# Patient Record
Sex: MALE | Race: WHITE | NOT HISPANIC OR LATINO | URBAN - METROPOLITAN AREA
[De-identification: names, ages, dates, MRNs, and addresses within clinical notes are randomized per-mention and may not be internally consistent; named-entity substitution may affect disease eponyms.]

---

## 2019-02-15 ENCOUNTER — OFFICE VISIT (OUTPATIENT)
Dept: URBAN - METROPOLITAN AREA CLINIC 29 | Facility: CLINIC | Age: 84
End: 2019-02-15
Payer: MEDICARE

## 2019-02-15 PROCEDURE — 99214 OFFICE O/P EST MOD 30 MIN: CPT | Performed by: OPHTHALMOLOGY

## 2019-02-15 PROCEDURE — 92133 CPTRZD OPH DX IMG PST SGM ON: CPT | Performed by: OPHTHALMOLOGY

## 2019-02-15 ASSESSMENT — INTRAOCULAR PRESSURE
OS: 12
OD: 10

## 2019-02-15 NOTE — IMPRESSION/PLAN
Impression: Diagnosis: Primary open-angle glaucoma, bilateral, severe stage. Code: C67.5252. Trabeculectomy ou - revision of Trab OD 2/5/18
- IOP doing well ou - ONH stable ou - Plan: Discussed diagnosis, explained and understood by patient. Discussed IOP/ONH/Glaucoma management and risks. OCT ordered and reviewed with patient. continue lumigan OS qhs and dorzolamide bid OS and timolol OS bid. no gtts OD needed. will continue to monitor. patient will see OD in [place] in 4 months.

## 2019-12-18 ENCOUNTER — OFFICE VISIT (OUTPATIENT)
Dept: URBAN - METROPOLITAN AREA CLINIC 29 | Facility: CLINIC | Age: 84
End: 2019-12-18
Payer: MEDICARE

## 2019-12-18 PROCEDURE — 99213 OFFICE O/P EST LOW 20 MIN: CPT | Performed by: OPTOMETRIST

## 2019-12-18 RX ORDER — NETARSUDIL 0.2 MG/ML
0.02 % SOLUTION/ DROPS OPHTHALMIC; TOPICAL
Qty: 1 | Refills: 12 | Status: INACTIVE
Start: 2019-12-18 | End: 2019-12-18

## 2019-12-18 RX ORDER — NETARSUDIL AND LATANOPROST OPHTHALMIC SOLUTION, 0.02%/0.005% .2; .05 MG/ML; MG/ML
SOLUTION/ DROPS OPHTHALMIC; TOPICAL
Qty: 1 | Refills: 12 | Status: INACTIVE
Start: 2019-12-18 | End: 2020-02-10

## 2019-12-18 RX ORDER — BIMATOPROST 0.1 MG/ML
0.01 % SOLUTION/ DROPS OPHTHALMIC
Qty: 1 | Refills: 12 | Status: INACTIVE
Start: 2019-12-18 | End: 2019-12-18

## 2019-12-18 ASSESSMENT — INTRAOCULAR PRESSURE
OD: 14
OS: 14

## 2019-12-18 NOTE — IMPRESSION/PLAN
Impression: Primary open-angle glaucoma, bilateral, severe stage: H40.1133 OU. Condition: established, stable. Plan: Discussed diagnosis in detail with patient. Discussed treatment options with patient. New medication(s) Rx given today. Discussed posable side affect of new medications. Will continue to observe condition and or symptoms. Patient instructed to call if side affects occur or if symptoms worsen. Reassured patient of current condition and treatment.

## 2020-01-31 ENCOUNTER — OFFICE VISIT (OUTPATIENT)
Dept: URBAN - METROPOLITAN AREA CLINIC 29 | Facility: CLINIC | Age: 85
End: 2020-01-31
Payer: MEDICARE

## 2020-01-31 PROCEDURE — 99213 OFFICE O/P EST LOW 20 MIN: CPT | Performed by: OPTOMETRIST

## 2020-01-31 RX ORDER — BIMATOPROST 0.1 MG/ML
0.01 % SOLUTION/ DROPS OPHTHALMIC
Qty: 5 | Refills: 5 | Status: INACTIVE
Start: 2020-01-31 | End: 2020-02-11

## 2020-01-31 ASSESSMENT — INTRAOCULAR PRESSURE
OS: 12
OD: 13

## 2020-01-31 NOTE — IMPRESSION/PLAN
Impression: Primary open-angle glaucoma, bilateral, severe stage: H40.1133 OU. Symptoms: IOP is stable. Plan: Discussed diagnosis in detail with patient. Discussed treatment options with patient. No change to current treatment. Will continue to observe condition and or symptoms. Continue using current medication(s). Medication refill given today. Emphasized and explained compliance.

## 2020-04-28 ENCOUNTER — CONSULT (OUTPATIENT)
Dept: URBAN - METROPOLITAN AREA CLINIC 24 | Facility: CLINIC | Age: 85
End: 2020-04-28
Payer: MEDICARE

## 2020-04-28 DIAGNOSIS — H40.1133 PRIMARY OPEN-ANGLE GLAUCOMA, BILATERAL, SEVERE STAGE: Primary | ICD-10-CM

## 2020-04-28 PROCEDURE — 92133 CPTRZD OPH DX IMG PST SGM ON: CPT | Performed by: OPHTHALMOLOGY

## 2020-04-28 PROCEDURE — 92014 COMPRE OPH EXAM EST PT 1/>: CPT | Performed by: OPHTHALMOLOGY

## 2020-04-28 PROCEDURE — 92250 FUNDUS PHOTOGRAPHY W/I&R: CPT | Performed by: OPHTHALMOLOGY

## 2020-04-28 PROCEDURE — 76514 ECHO EXAM OF EYE THICKNESS: CPT | Performed by: OPHTHALMOLOGY

## 2020-04-28 PROCEDURE — 92083 EXTENDED VISUAL FIELD XM: CPT | Performed by: OPHTHALMOLOGY

## 2020-04-28 ASSESSMENT — INTRAOCULAR PRESSURE
OS: 23
OD: 20

## 2020-05-12 ENCOUNTER — SURGERY (OUTPATIENT)
Dept: URBAN - METROPOLITAN AREA SURGERY 12 | Facility: SURGERY | Age: 85
End: 2020-05-12
Payer: MEDICARE

## 2020-05-12 PROCEDURE — 65855 TRABECULOPLASTY LASER SURG: CPT | Performed by: OPHTHALMOLOGY

## 2020-06-18 ENCOUNTER — POST OP (OUTPATIENT)
Dept: URBAN - METROPOLITAN AREA CLINIC 26 | Facility: CLINIC | Age: 85
End: 2020-06-18
Payer: MEDICARE

## 2020-06-18 PROCEDURE — 99024 POSTOP FOLLOW-UP VISIT: CPT | Performed by: OPTOMETRIST

## 2020-06-18 ASSESSMENT — INTRAOCULAR PRESSURE
OD: 11
OS: 16
OD: 12
OS: 17

## 2020-06-23 ENCOUNTER — SURGERY (OUTPATIENT)
Dept: URBAN - METROPOLITAN AREA SURGERY 12 | Facility: SURGERY | Age: 85
End: 2020-06-23
Payer: MEDICARE

## 2020-06-23 PROCEDURE — 65855 TRABECULOPLASTY LASER SURG: CPT | Performed by: OPHTHALMOLOGY

## 2020-10-20 ENCOUNTER — FOLLOW UP ESTABLISHED (OUTPATIENT)
Dept: URBAN - METROPOLITAN AREA CLINIC 24 | Facility: CLINIC | Age: 85
End: 2020-10-20
Payer: MEDICARE

## 2020-10-20 PROCEDURE — 92012 INTRM OPH EXAM EST PATIENT: CPT | Performed by: OPHTHALMOLOGY

## 2020-10-20 ASSESSMENT — INTRAOCULAR PRESSURE
OS: 13
OD: 11

## 2021-04-27 ENCOUNTER — OFFICE VISIT (OUTPATIENT)
Dept: URBAN - METROPOLITAN AREA CLINIC 24 | Facility: CLINIC | Age: 86
End: 2021-04-27
Payer: MEDICARE

## 2021-04-27 PROCEDURE — 92250 FUNDUS PHOTOGRAPHY W/I&R: CPT | Performed by: OPHTHALMOLOGY

## 2021-04-27 PROCEDURE — 92083 EXTENDED VISUAL FIELD XM: CPT | Performed by: OPHTHALMOLOGY

## 2021-04-27 PROCEDURE — 99214 OFFICE O/P EST MOD 30 MIN: CPT | Performed by: OPHTHALMOLOGY

## 2021-04-27 ASSESSMENT — INTRAOCULAR PRESSURE
OS: 22
OD: 13

## 2021-04-27 NOTE — IMPRESSION/PLAN
Impression: Primary open-angle glaucoma, bilateral, severe stage Plan: PT HAS POAG OU                                                       PACHS: 540//543 PT IS UNDER CARE OF DR. Kevin Davis @ [address] , PT ;RESENTS FOR IOP CHECK THE PT HAD  REPORTED PROGRESSIVE SIGHT LOSS OVER THE PAST 3 MONTHS PRIOR TO PRESENTING ON 4/28/20 S/P SLT OD 5/12/20  S/P SLT OS 6/23/20 PT DENIES SULFA ALLERGY 
PT DENIES LUNG DZ 
TARGET IOP LOW TEENS OR LESS 
RECOMMEND : 
1. CONTINUE ROCKLATAN OU QPM (SWITCHED FROM RHOPRESSA BY DR Edmar Shaikh 03/09/20) 2. CONTINUE DORZOLAMIDE OS QAM (IOP WAS LOW OD, SWITCHED BY DR Edmar Shaikh 03/09/20) 3. OFFERED PT OPTION OF SLT OS TO ATTEMPT TO LOWER IOP 2-4MM FOR 6-12 MONTHS. THE PT WOULD LIKE TO PROCEED WITH SLT OS. THE PT IS AWARE OF THE LIMITATIONS OF SLT WHICH WILL NOT REPLACE HIS TOPICAL MEDICATIONS NOR IMPROVE HIS VISION. 4. SCHEDULE SLT OS 4/27/21

## 2021-05-11 ENCOUNTER — SURGERY (OUTPATIENT)
Dept: URBAN - METROPOLITAN AREA SURGERY 12 | Facility: SURGERY | Age: 86
End: 2021-05-11
Payer: MEDICARE

## 2021-05-11 PROCEDURE — 65855 TRABECULOPLASTY LASER SURG: CPT | Performed by: OPHTHALMOLOGY

## 2021-10-14 ENCOUNTER — OFFICE VISIT (OUTPATIENT)
Dept: URBAN - METROPOLITAN AREA CLINIC 24 | Facility: CLINIC | Age: 86
End: 2021-10-14
Payer: MEDICARE

## 2021-10-14 PROCEDURE — 99213 OFFICE O/P EST LOW 20 MIN: CPT | Performed by: OPHTHALMOLOGY

## 2021-10-14 PROCEDURE — 92133 CPTRZD OPH DX IMG PST SGM ON: CPT | Performed by: OPHTHALMOLOGY

## 2021-10-14 RX ORDER — DORZOLAMIDE HCL 20 MG/ML
2 % SOLUTION/ DROPS OPHTHALMIC
Qty: 10 | Refills: 0 | Status: ACTIVE
Start: 2021-10-14

## 2021-10-14 RX ORDER — NETARSUDIL AND LATANOPROST OPHTHALMIC SOLUTION, 0.02%/0.005% .2; .05 MG/ML; MG/ML
SOLUTION/ DROPS OPHTHALMIC; TOPICAL
Qty: 15 | Refills: 3 | Status: ACTIVE
Start: 2021-10-14

## 2021-10-14 ASSESSMENT — INTRAOCULAR PRESSURE
OS: 11
OD: 7

## 2021-10-14 NOTE — IMPRESSION/PLAN
Impression: Primary open-angle glaucoma, bilateral, severe stage s/p trab OU Plan: Pt has Glaucoma    Gonio :        Pachs: 540/543     Today's IOP :   7/11      Tmax  :  
Target IOP low teens to high single digits Pt denies Fhx of Glaucoma Left eye is the better seeing eye S/P SLT OD 5/12/20  S/P SLT OS 6/23/20 S/P OS 5/11/21 Last VF 4/28/20 Global loss C/D:  
OCT: 10/14/21 Pt denies Sulfa Allergy   // Pt denies Lung /Heart dx Plan :
1. Continue Rocklatan QHS OS
Dorzolamide BID OS 2. Doing well with IOP today; previous IOP during summer with 17 OS per pt 3. Gave [employer] grid for home checks daily given amount of vision loss - instructions on how to use 4.  Return in 2 months for HVF 10-2 OU and IOP check

## 2021-12-15 ENCOUNTER — OFFICE VISIT (OUTPATIENT)
Dept: URBAN - METROPOLITAN AREA CLINIC 24 | Facility: CLINIC | Age: 86
End: 2021-12-15
Payer: MEDICARE

## 2021-12-15 PROCEDURE — 92083 EXTENDED VISUAL FIELD XM: CPT | Performed by: OPHTHALMOLOGY

## 2021-12-15 PROCEDURE — 99213 OFFICE O/P EST LOW 20 MIN: CPT | Performed by: OPHTHALMOLOGY

## 2021-12-15 ASSESSMENT — INTRAOCULAR PRESSURE
OD: 8
OS: 11

## 2021-12-15 NOTE — IMPRESSION/PLAN
Impression: Primary open-angle glaucoma, bilateral, severe stage s/p trab OU Plan: Pt has Glaucoma    Gonio :        Pachs: 540/543     Today's IOP :   8, 11      Tmax  :  
Target IOP low teens to high single digits Pt denies Fhx of Glaucoma Left eye is the better seeing eye S/P SLT OD 5/12/20  S/P SLT OS 6/23/20 S/P OS 5/11/21 Last VF 4/28/20 Global loss HVf 10-2 (12/15/21) OD: Good-worsening of scotoma; OS: Good-worsening of scotoma C/D:  
OCT: 10/14/21 Pt denies Sulfa Allergy   // Pt denies Lung /Heart dx Plan :
1. Continue Rocklatan QHS OS
Dorzolamide BID OS 2. Doing well with IOP today; continue as directed.  
3. Return in 3 months for IOP check

## 2022-03-10 ENCOUNTER — OFFICE VISIT (OUTPATIENT)
Dept: URBAN - METROPOLITAN AREA CLINIC 24 | Facility: CLINIC | Age: 87
End: 2022-03-10
Payer: MEDICARE

## 2022-03-10 DIAGNOSIS — Z96.1 PRESENCE OF INTRAOCULAR LENS: ICD-10-CM

## 2022-03-10 PROCEDURE — 99213 OFFICE O/P EST LOW 20 MIN: CPT | Performed by: OPHTHALMOLOGY

## 2022-03-10 ASSESSMENT — INTRAOCULAR PRESSURE
OD: 9
OS: 19

## 2022-03-10 NOTE — IMPRESSION/PLAN
Impression: Primary open-angle glaucoma, bilateral, severe stage s/p trab OU Plan: Pt has Glaucoma    Gonio :  ss ou      Pachs: 540/543     Today's IOP :   9, 19     Tmax  :  50, 23 Target IOP low teens to high single digits Pt denies Fhx of Glaucoma Left eye is the better seeing eye S/P SLT OD 5/12/20  S/P SLT OS 6/23/20 S/P OS 5/11/21 Last VF 4/28/20 Global loss HVf 10-2 (12/15/21) OD: Good-worsening of scotoma; OS: Good-worsening of scotoma C/D:  .8-.85 / .9-.9 OCT: 10/14/21 Pt denies Sulfa Allergy   // Pt denies Lung /Heart dx Plan :
1. Continue Rocklatan QHS OS
Dorzolamide BID OS 2. Doing well. IOP is elevated. Patient states there is a possibility he is washing out the drops. Will recheck IOP to validate IOP spike.  
3. Return in 1-2 weeks for IOP check - If IOP is still elevated will proceed with SLT

## 2022-04-01 ENCOUNTER — OFFICE VISIT (OUTPATIENT)
Dept: URBAN - METROPOLITAN AREA CLINIC 24 | Facility: CLINIC | Age: 87
End: 2022-04-01
Payer: MEDICARE

## 2022-04-01 PROCEDURE — 99213 OFFICE O/P EST LOW 20 MIN: CPT | Performed by: STUDENT IN AN ORGANIZED HEALTH CARE EDUCATION/TRAINING PROGRAM

## 2022-04-01 ASSESSMENT — INTRAOCULAR PRESSURE
OD: 10
OS: 18

## 2022-04-01 NOTE — IMPRESSION/PLAN
Impression: Primary open-angle glaucoma, bilateral, severe stage s/p trab OU Plan: Cont elevation of IOP OS
IOP today 10/18mmHg; per pt good compliance with medications Continue Rocklatan QHS OS & Dorzolamide BID OS Per Dr Avila Market if cont IOP elevation can proceed with SLT RTC next available for possible SLT

## 2022-04-20 ENCOUNTER — OFFICE VISIT (OUTPATIENT)
Dept: URBAN - METROPOLITAN AREA CLINIC 24 | Facility: CLINIC | Age: 87
End: 2022-04-20
Payer: MEDICARE

## 2022-04-20 DIAGNOSIS — Z96.1 PRESENCE OF INTRAOCULAR LENS: ICD-10-CM

## 2022-04-20 DIAGNOSIS — H40.1133 PRIMARY OPEN-ANGLE GLAUCOMA, BILATERAL, SEVERE STAGE: Primary | ICD-10-CM

## 2022-04-20 PROCEDURE — 99214 OFFICE O/P EST MOD 30 MIN: CPT | Performed by: OPHTHALMOLOGY

## 2022-04-20 RX ORDER — PREDNISOLONE ACETATE 10 MG/ML
1 % SUSPENSION/ DROPS OPHTHALMIC
Qty: 5 | Refills: 1 | Status: ACTIVE
Start: 2022-04-20

## 2022-04-20 ASSESSMENT — INTRAOCULAR PRESSURE
OS: 16
OD: 10

## 2022-04-20 NOTE — IMPRESSION/PLAN
Impression: Primary open-angle glaucoma, bilateral, severe stage s/p trab OU Plan: Pt has Glaucoma    Gonio :  ss ou      Pachs: 540/543     Today's IOP : 10/16     Tmax  :  50, 23 Target IOP low teens to high single digits Pt denies Fhx of Glaucoma Left eye is the better seeing eye S/P SLT OD 5/12/20  S/P SLT OS 6/23/20 S/P OS 5/11/21 Last VF 4/28/20 Global loss HVf 10-2 (12/15/21) OD: Good-worsening of scotoma; OS: Good-worsening of scotoma C/D:  .8-.85 / .9-.9 OCT: 10/14/21 Pt denies Sulfa Allergy   // Pt denies Lung /Heart dx Plan :
1. Continue Rocklatan QHS OS
Dorzolamide BID OS 2. Doing well. IOP is elevated, Recommend lowering. 4.  Recommend SLT  this Friday. The patient is aware of the limitations of SLT , which can lower IOP 2-4mm for 6--12 months. The Patient is aware that SLT cannot improve the vision nor eliminate the need for the topical medications. If on any glaucoma medications, the patient is aware that SLT is not a replacement for the current medical regimen. **Risk level 1
*** 6-8 week Follow up after laser **Pred TID x 1 week

## 2022-12-12 ENCOUNTER — OFFICE VISIT (OUTPATIENT)
Dept: URBAN - METROPOLITAN AREA CLINIC 24 | Facility: CLINIC | Age: 87
End: 2022-12-12
Payer: MEDICARE

## 2022-12-12 DIAGNOSIS — H57.11 OCULAR PAIN, RIGHT EYE: ICD-10-CM

## 2022-12-12 DIAGNOSIS — H16.143 PUNCTATE KERATITIS, BILATERAL: ICD-10-CM

## 2022-12-12 DIAGNOSIS — H40.1133 PRIMARY OPEN-ANGLE GLAUCOMA, BILATERAL, SEVERE STAGE: Primary | ICD-10-CM

## 2022-12-12 PROCEDURE — 92133 CPTRZD OPH DX IMG PST SGM ON: CPT | Performed by: OPTOMETRIST

## 2022-12-12 PROCEDURE — 99214 OFFICE O/P EST MOD 30 MIN: CPT | Performed by: OPTOMETRIST

## 2022-12-12 ASSESSMENT — INTRAOCULAR PRESSURE
OS: 14
OD: 12
OS: 17
OD: 18

## 2022-12-12 ASSESSMENT — KERATOMETRY
OD: 43.20
OS: 44.80

## 2022-12-12 ASSESSMENT — VISUAL ACUITY
OS: 20/60
OD: 20/125

## 2022-12-12 NOTE — IMPRESSION/PLAN
Impression: Ocular pain, right eye: H57.11. Plan: Pt reports soreness behind the right eye x weeks. Ocular health stable, does not seem to be contributing. If soreness persists recommend consultation with ENT to r/o sinus involvement.

## 2022-12-12 NOTE — IMPRESSION/PLAN
Impression: Primary open-angle glaucoma, bilateral, severe stage - s/p trab OU
- SLT OS (4/22/22) Plan: IOP stable today @ 12/14; pt compliant with gtts Continue Rocklatan QHS OS and Dorzolamide BID OS Under the care of Dr. Sharyle Nones, keep all appts as scheduled.